# Patient Record
Sex: FEMALE | Race: WHITE | NOT HISPANIC OR LATINO | URBAN - METROPOLITAN AREA
[De-identification: names, ages, dates, MRNs, and addresses within clinical notes are randomized per-mention and may not be internally consistent; named-entity substitution may affect disease eponyms.]

---

## 2022-06-04 ENCOUNTER — WEB ENCOUNTER (OUTPATIENT)
Dept: URBAN - METROPOLITAN AREA CLINIC 23 | Facility: CLINIC | Age: 52
End: 2022-06-04

## 2022-06-07 ENCOUNTER — OFFICE VISIT (OUTPATIENT)
Dept: URBAN - METROPOLITAN AREA CLINIC 23 | Facility: CLINIC | Age: 52
End: 2022-06-07
Payer: COMMERCIAL

## 2022-06-07 ENCOUNTER — LAB OUTSIDE AN ENCOUNTER (OUTPATIENT)
Dept: URBAN - METROPOLITAN AREA CLINIC 23 | Facility: CLINIC | Age: 52
End: 2022-06-07

## 2022-06-07 ENCOUNTER — TELEPHONE ENCOUNTER (OUTPATIENT)
Dept: URBAN - METROPOLITAN AREA CLINIC 49 | Facility: CLINIC | Age: 52
End: 2022-06-07

## 2022-06-07 VITALS
HEIGHT: 65 IN | BODY MASS INDEX: 48.32 KG/M2 | DIASTOLIC BLOOD PRESSURE: 86 MMHG | WEIGHT: 290 LBS | HEART RATE: 65 BPM | SYSTOLIC BLOOD PRESSURE: 137 MMHG | TEMPERATURE: 98 F

## 2022-06-07 DIAGNOSIS — R12 HEARTBURN: ICD-10-CM

## 2022-06-07 DIAGNOSIS — Z12.11 COLON CANCER SCREENING: ICD-10-CM

## 2022-06-07 DIAGNOSIS — K21.9 GERD: ICD-10-CM

## 2022-06-07 DIAGNOSIS — R10.13 DYSPEPSIA: ICD-10-CM

## 2022-06-07 DIAGNOSIS — R14.0 ABDOMINAL BLOATING: ICD-10-CM

## 2022-06-07 DIAGNOSIS — K58.9 IRRITABLE BOWEL SYNDROME (IBS): ICD-10-CM

## 2022-06-07 PROBLEM — 10743008 IRRITABLE BOWEL SYNDROME: Status: ACTIVE | Noted: 2022-06-07

## 2022-06-07 PROCEDURE — 99244 OFF/OP CNSLTJ NEW/EST MOD 40: CPT | Performed by: INTERNAL MEDICINE

## 2022-06-07 RX ORDER — TRAZODONE HYDROCHLORIDE 50 MG/1
TABLET ORAL
Qty: 0 | Refills: 0 | Status: ACTIVE | COMMUNITY
Start: 1900-01-01

## 2022-06-07 RX ORDER — LEVOTHYROXINE SODIUM 125 UG/1
TABLET ORAL
Qty: 0 | Refills: 0 | Status: ACTIVE | COMMUNITY
Start: 1900-01-01

## 2022-06-07 RX ORDER — HYOSCYAMINE SULFATE 0.12 MG/1
TABLET ORAL
Qty: 0 | Refills: 0 | Status: ACTIVE | COMMUNITY
Start: 1900-01-01

## 2022-06-07 NOTE — HPI-TODAY'S VISIT:
- 50 yo  female, referred by Dr. Geno Roman for further evaluation of GI complaints as detailed below.  A copy of this note will be sent to the referring physician. - She is felt to have chronic IBS.  Patient states it flares up about once every 1-2 months, with GI symptoms including bloating, gas, abdominal cramping.  When she gets these flares, she takes hyoscyamine which helps. - She gets diarrhea alternating with constipation - She gets chronic and intermittent heartburn and dyspepsia symptoms, for which she takes Tums or Rolaids as needed, usually about 2-3 times per week - Takes ibuprofen once a week for leg pains and joint pains   - Denies hematochezia, change in bowel habits, or weight loss - Denies family history of GI malignancies in any first degree relatives - She is an Oncology Nurse with [employer]

## 2022-06-07 NOTE — PHYSICAL EXAM CONSTITUTIONAL:
morbidly obese, in no acute distress , ambulating without difficulty , normal communication ability

## 2022-06-24 PROBLEM — 116289008 ABDOMINAL BLOATING: Status: ACTIVE | Noted: 2022-06-24

## 2022-06-24 PROBLEM — 162031009 DYSPEPSIA: Status: ACTIVE | Noted: 2022-06-24

## 2022-06-24 PROBLEM — 16331000 HEARTBURN: Status: ACTIVE | Noted: 2022-06-24

## 2022-07-25 ENCOUNTER — TELEPHONE ENCOUNTER (OUTPATIENT)
Dept: URBAN - METROPOLITAN AREA CLINIC 92 | Facility: CLINIC | Age: 52
End: 2022-07-25

## 2022-07-25 ENCOUNTER — LAB OUTSIDE AN ENCOUNTER (OUTPATIENT)
Dept: URBAN - METROPOLITAN AREA CLINIC 23 | Facility: CLINIC | Age: 52
End: 2022-07-25

## 2022-07-25 ENCOUNTER — OFFICE VISIT (OUTPATIENT)
Dept: URBAN - METROPOLITAN AREA MEDICAL CENTER 27 | Facility: MEDICAL CENTER | Age: 52
End: 2022-07-25
Payer: COMMERCIAL

## 2022-07-25 DIAGNOSIS — Z12.11 COLON CANCER SCREENING: ICD-10-CM

## 2022-07-25 DIAGNOSIS — K29.30 CHRONIC SUPERFICIAL GASTRITIS: ICD-10-CM

## 2022-07-25 DIAGNOSIS — K62.1 ANAL AND RECTAL POLYP: ICD-10-CM

## 2022-07-25 DIAGNOSIS — D12.3 ADENOMA OF TRANSVERSE COLON: ICD-10-CM

## 2022-07-25 DIAGNOSIS — K22.89 DILATATION OF ESOPHAGUS: ICD-10-CM

## 2022-07-25 LAB
GLUCOSE POC: 94
PERFORMING LAB: (no result)

## 2022-07-25 PROCEDURE — 45380 COLONOSCOPY AND BIOPSY: CPT | Performed by: INTERNAL MEDICINE

## 2022-07-25 PROCEDURE — 45385 COLONOSCOPY W/LESION REMOVAL: CPT | Performed by: INTERNAL MEDICINE

## 2022-07-25 PROCEDURE — 43239 EGD BIOPSY SINGLE/MULTIPLE: CPT | Performed by: INTERNAL MEDICINE

## 2022-07-25 RX ORDER — TRAZODONE HYDROCHLORIDE 50 MG/1
TABLET ORAL
Qty: 0 | Refills: 0 | Status: ACTIVE | COMMUNITY
Start: 1900-01-01

## 2022-07-25 RX ORDER — LEVOTHYROXINE SODIUM 125 UG/1
TABLET ORAL
Qty: 0 | Refills: 0 | Status: ACTIVE | COMMUNITY
Start: 1900-01-01

## 2022-07-25 RX ORDER — HYOSCYAMINE SULFATE 0.12 MG/1
TABLET ORAL
Qty: 0 | Refills: 0 | Status: ACTIVE | COMMUNITY
Start: 1900-01-01

## 2023-08-21 ENCOUNTER — OFFICE VISIT (OUTPATIENT)
Dept: URBAN - METROPOLITAN AREA CLINIC 23 | Facility: CLINIC | Age: 53
End: 2023-08-21

## 2023-10-16 ENCOUNTER — OFFICE VISIT (OUTPATIENT)
Dept: URBAN - METROPOLITAN AREA CLINIC 23 | Facility: CLINIC | Age: 53
End: 2023-10-16
Payer: COMMERCIAL

## 2023-10-16 VITALS
TEMPERATURE: 97.3 F | DIASTOLIC BLOOD PRESSURE: 82 MMHG | SYSTOLIC BLOOD PRESSURE: 117 MMHG | BODY MASS INDEX: 48.65 KG/M2 | HEART RATE: 59 BPM | HEIGHT: 65 IN | WEIGHT: 292 LBS

## 2023-10-16 DIAGNOSIS — K44.9 HIATAL HERNIA: ICD-10-CM

## 2023-10-16 DIAGNOSIS — K21.9 CHRONIC GERD: ICD-10-CM

## 2023-10-16 DIAGNOSIS — Z86.010 HISTORY OF ADENOMATOUS POLYP OF COLON: ICD-10-CM

## 2023-10-16 DIAGNOSIS — E66.01 MORBID OBESITY: ICD-10-CM

## 2023-10-16 PROCEDURE — 99214 OFFICE O/P EST MOD 30 MIN: CPT | Performed by: INTERNAL MEDICINE

## 2023-10-16 RX ORDER — LEVOTHYROXINE SODIUM 125 UG/1
TABLET ORAL
Qty: 0 | Refills: 0 | Status: ACTIVE | COMMUNITY
Start: 1900-01-01

## 2023-10-16 RX ORDER — TRAZODONE HYDROCHLORIDE 50 MG/1
TABLET ORAL
Qty: 0 | Refills: 0 | Status: ACTIVE | COMMUNITY
Start: 1900-01-01

## 2023-10-16 RX ORDER — HYOSCYAMINE SULFATE 0.12 MG/1
TABLET ORAL
Qty: 0 | Refills: 0 | Status: ACTIVE | COMMUNITY
Start: 1900-01-01

## 2023-10-16 NOTE — HPI-TODAY'S VISIT:
- 53 yo  female who returns for follow-up; I last saw her in July 2022 - Patient has chronic GERD with a small-sized hiatal hernia and prior history of LA grade A reflux esophagitis, without prior evidence of Villafuerte's esophagus - She is doing well on pantoprazole 20 mg once a day, with good control of acid reflux symptoms - Her IBS is stable. She takes hyoscyamine as needed, on rare occasions - Patient has a history of adenomatous colon polyps.  I performed her last colonoscopy in 2022 and I recommended a surveillance colonoscopy in 5 years. - I have discussed with the patient the findings of previous endoscopic procedures and pathology results. All questions were answered to their satisfaction.

## 2023-12-01 ENCOUNTER — DASHBOARD ENCOUNTERS (OUTPATIENT)
Age: 53
End: 2023-12-01

## 2025-01-27 ENCOUNTER — WEB ENCOUNTER (OUTPATIENT)
Dept: URBAN - METROPOLITAN AREA CLINIC 23 | Facility: CLINIC | Age: 55
End: 2025-01-27